# Patient Record
Sex: FEMALE | Race: WHITE
[De-identification: names, ages, dates, MRNs, and addresses within clinical notes are randomized per-mention and may not be internally consistent; named-entity substitution may affect disease eponyms.]

---

## 2018-10-08 ENCOUNTER — HOSPITAL ENCOUNTER (EMERGENCY)
Dept: HOSPITAL 25 - UCCORT | Age: 23
Discharge: HOME | End: 2018-10-08
Payer: COMMERCIAL

## 2018-10-08 VITALS — SYSTOLIC BLOOD PRESSURE: 127 MMHG | DIASTOLIC BLOOD PRESSURE: 74 MMHG

## 2018-10-08 DIAGNOSIS — B37.3: Primary | ICD-10-CM

## 2018-10-08 PROCEDURE — 87106 FUNGI IDENTIFICATION YEAST: CPT

## 2018-10-08 PROCEDURE — G0463 HOSPITAL OUTPT CLINIC VISIT: HCPCS

## 2018-10-08 PROCEDURE — 99202 OFFICE O/P NEW SF 15 MIN: CPT

## 2018-10-08 PROCEDURE — 87086 URINE CULTURE/COLONY COUNT: CPT

## 2018-10-08 PROCEDURE — 81003 URINALYSIS AUTO W/O SCOPE: CPT

## 2018-10-08 NOTE — UC
Complaint Female HPI





- HPI Summary


HPI Summary: 





vaginal discharge with some whitish , brownish discharge, some discomfort, and 

dysuria, no fever or chills. using oral contraceptives , hx. of previous 

episode of vaginitis . not had unprotected sexual relations.





- History Of Current Complaint


Chief Complaint: [facility]U


Stated Complaint: POSS YEAST INFECTION


Time Seen by Provider: 10/08/18 07:53


Hx Obtained From: Patient


Hx Last Menstrual Period: 09/10/18


Pregnant?: No


Onset/Duration: Lasting Days


Timing: Constant


Severity Initially: Mild


Severity Currently: None


Pain Intensity: 0





- Allergies/Home Medications


Allergies/Adverse Reactions: 


 Allergies











Allergy/AdvReac Type Severity Reaction Status Date / Time


 


No Known Allergies Allergy   Verified 10/08/18 07:38











Home Medications: 


 Home Medications





Norethindrone-Ethinyl Estrad [Nortrel 1/35 (21)] 1 tab PO DAILY 10/08/18 [

History Confirmed 10/08/18]











PMH/Surg Hx/FS Hx/Imm Hx


Previously Healthy: Yes





- Surgical History


Surgical History: Yes


Surgery Procedure, Year, and Place: fasciotomy bilateral legs 2015





- Social History


Alcohol Use: Occasionally


Substance Use Type: None


Smoking Status (MU): Never Smoked Tobacco





Review of Systems


Constitutional: Negative


Skin: Negative


Eyes: Negative


ENT: Negative


Respiratory: Negative


Cardiovascular: Negative


Gastrointestinal: Negative


Genitourinary: Negative


Motor: Negative


Neurovascular: Negative


Musculoskeletal: Negative


Neurological: Negative


Psychological: Negative


All Other Systems Reviewed And Are Negative: Yes





Physical Exam


Triage Information Reviewed: Yes


Appearance: Well-Appearing


Vital Signs: 


 Initial Vital Signs











Temp  36.9 C   10/08/18 07:33


 


Pulse  64   10/08/18 07:33


 


Resp  13   10/08/18 07:33


 


BP  127/74   10/08/18 07:33


 


Pulse Ox  100   10/08/18 07:33











Vital Signs Reviewed: Yes


Eye Exam: Normal


Abdominal Exam: Normal


Abdomen Description: Positive: Nontender


Pelvic Exam: Positive: Other - patient elects not to be examined at this time





 Complaint Female Dx





- Differential Dx/Diagnosis


Provider Diagnoses: candidal vaginitis





Discharge





- Sign-Out/Discharge


Documenting (check all that apply): Patient Departure


All imaging exams completed and their final reports reviewed: No Studies





- Discharge Plan


Condition: Stable


Disposition: HOME


Prescriptions: 


Fluconazole [Diflucan] 100 mg PO ONCE #1 tablet


Patient Education Materials:  Vaginitis (ED)


Referrals: 


Eugenio Glover MD [Primary Care Provider] - 





- Billing Disposition and Condition


Condition: STABLE


Disposition: Home

## 2019-10-18 ENCOUNTER — HOSPITAL ENCOUNTER (EMERGENCY)
Dept: HOSPITAL 25 - UCCORT | Age: 24
Discharge: HOME | End: 2019-10-18
Payer: COMMERCIAL

## 2019-10-18 VITALS — DIASTOLIC BLOOD PRESSURE: 66 MMHG | SYSTOLIC BLOOD PRESSURE: 106 MMHG

## 2019-10-18 DIAGNOSIS — R09.89: ICD-10-CM

## 2019-10-18 DIAGNOSIS — J02.0: Primary | ICD-10-CM

## 2019-10-18 DIAGNOSIS — R09.81: ICD-10-CM

## 2019-10-18 PROCEDURE — G0463 HOSPITAL OUTPT CLINIC VISIT: HCPCS

## 2019-10-18 PROCEDURE — 99212 OFFICE O/P EST SF 10 MIN: CPT

## 2019-10-18 PROCEDURE — 87651 STREP A DNA AMP PROBE: CPT

## 2019-10-18 NOTE — UC
Throat Pain/Nasal Dima HPI





- HPI Summary


HPI Summary: 





Chills, fever, cough, sore throat, ear pain x2-3 days. Pt denies sinus pressure 

and congestion





- History of Current Complaint


Chief Complaint: UCGeneralIllness


Stated Complaint: SORE THROAT


Time Seen by Provider: 10/18/19 14:19


Hx Obtained From: Patient


Hx Last Menstrual Period: 10/17/19


Pregnant?: No


Onset/Duration: Sudden Onset, Lasting Days


Severity: Moderate


Pain Intensity: 4


Associated Signs & Symptoms: Positive: Dysphagia, Sinus Discomfort, Nasal 

Discharge





- Allergies/Home Medications


Allergies/Adverse Reactions: 


 Allergies











Allergy/AdvReac Type Severity Reaction Status Date / Time


 


No Known Allergies Allergy   Verified 10/18/19 13:54











Home Medications: 


 Home Medications





Phenylephrine/Dm/Acetaminop/GG [Tylenol Cold-Flu Severe Caplet] 2 tab PO ONCE 10

/18/19 [History Confirmed 10/18/19]











PMH/Surg Hx/FS Hx/Imm Hx


Previously Healthy: Yes





- Surgical History


Surgical History: Yes


Surgery Procedure, Year, and Place: fasciotomy bilateral legs 2015





- Family History


Known Family History: Positive: Cardiac Disease, Hypertension





- Social History


Alcohol Use: Occasionally


Substance Use Type: None


Smoking Status (MU): Never Smoked Tobacco





Review of Systems


All Other Systems Reviewed And Are Negative: Yes


Constitutional: Positive: Fever


ENT: Positive: Sore Throat, Ear Ache, Nasal Discharge


Respiratory: Positive: Cough


Cardiovascular: Positive: Negative


Gastrointestinal: Positive: Negative


Genitourinary: Positive: Negative


Is Patient Immunocompromised?: No





Physical Exam


Triage Information Reviewed: Yes


Appearance: Well-Nourished, Ill-Appearing, Pain Distress


Vital Signs: 


 Initial Vital Signs











Temp  98.7 F   10/18/19 13:50


 


Pulse  123   10/18/19 13:50


 


Resp  18   10/18/19 13:50


 


BP  106/66   10/18/19 13:50


 


Pulse Ox  100   10/18/19 13:50











Vital Signs Reviewed: Yes


Eye Exam: Normal


ENT: Positive: Pharyngeal erythema, TM bulging, Tonsillar swelling, Tonsillar 

exudate


Dental Exam: Normal


Neck exam: Normal


Respiratory Exam: Normal


Respiratory: Positive: Chest non-tender, Lungs clear, Normal breath sounds


Cardiovascular: Positive: No Murmur, Pulses Normal, Tachycardia


Abdominal Exam: Normal


Bowel Sounds: Positive: Present


Musculoskeletal Exam: Normal


Neurological Exam: Normal


Psychological Exam: Normal


Skin Exam: Normal





Throat Pain/Nasal Course/Dx





- Course


Course Of Treatment: 





hx obtained, exam performed ,meds reviewed, rapid strep is positive, treated.





- Differential Dx/Diagnosis


Differential Diagnosis/HQI/PQRI: Otitis Media, Pharyngitis, Sinusitis, URI


Provider Diagnosis: 


 Strep pharyngitis








Discharge ED





- Sign-Out/Discharge


Documenting (check all that apply): Patient Departure


All imaging exams completed and their final reports reviewed: No Studies





- Discharge Plan


Condition: Stable


Disposition: HOME


Patient Education Materials:  Strep Throat (ED)


Referrals: 


Eugenio Glover MD [Primary Care Provider] - 


Additional Instructions: 


1. take the medication as prescribed.


2. Increase fluids and get rest


3. Ibuprofen and tylenol for pain and fever.





- Billing Disposition and Condition


Condition: STABLE


Disposition: Home